# Patient Record
Sex: MALE | Race: WHITE | Employment: FULL TIME | URBAN - METROPOLITAN AREA
[De-identification: names, ages, dates, MRNs, and addresses within clinical notes are randomized per-mention and may not be internally consistent; named-entity substitution may affect disease eponyms.]

---

## 2019-05-18 ENCOUNTER — APPOINTMENT (OUTPATIENT)
Dept: CT IMAGING | Facility: HOSPITAL | Age: 39
DRG: 439 | End: 2019-05-18
Attending: EMERGENCY MEDICINE
Payer: COMMERCIAL

## 2019-05-18 ENCOUNTER — HOSPITAL ENCOUNTER (INPATIENT)
Facility: HOSPITAL | Age: 39
LOS: 4 days | Discharge: HOME | DRG: 439 | End: 2019-05-22
Attending: EMERGENCY MEDICINE | Admitting: INTERNAL MEDICINE
Payer: COMMERCIAL

## 2019-05-18 DIAGNOSIS — R73.9 HYPERGLYCEMIA: ICD-10-CM

## 2019-05-18 DIAGNOSIS — K85.90 ACUTE PANCREATITIS, UNSPECIFIED COMPLICATION STATUS, UNSPECIFIED PANCREATITIS TYPE: Primary | ICD-10-CM

## 2019-05-18 PROBLEM — E87.3 METABOLIC ALKALOSIS: Status: ACTIVE | Noted: 2019-05-18

## 2019-05-18 PROBLEM — E87.1 HYPONATREMIA: Status: ACTIVE | Noted: 2019-05-18

## 2019-05-18 PROBLEM — E87.6 HYPOKALEMIA: Status: ACTIVE | Noted: 2019-05-18

## 2019-05-18 PROCEDURE — 74177 CT ABD & PELVIS W/CONTRAST: CPT | Performed by: EMERGENCY MEDICINE

## 2019-05-18 RX ORDER — ENOXAPARIN SODIUM 100 MG/ML
40 INJECTION SUBCUTANEOUS DAILY
Status: DISCONTINUED | OUTPATIENT
Start: 2019-05-18 | End: 2019-05-22

## 2019-05-18 RX ORDER — DIAZEPAM 5 MG/1
10 TABLET ORAL 2 TIMES DAILY PRN
Status: DISCONTINUED | OUTPATIENT
Start: 2019-05-18 | End: 2019-05-22

## 2019-05-18 RX ORDER — KETOROLAC TROMETHAMINE 30 MG/ML
30 INJECTION, SOLUTION INTRAMUSCULAR; INTRAVENOUS EVERY 6 HOURS PRN
Status: DISCONTINUED | OUTPATIENT
Start: 2019-05-18 | End: 2019-05-22

## 2019-05-18 RX ORDER — KETOROLAC TROMETHAMINE 30 MG/ML
30 INJECTION, SOLUTION INTRAMUSCULAR; INTRAVENOUS ONCE
Status: COMPLETED | OUTPATIENT
Start: 2019-05-18 | End: 2019-05-18

## 2019-05-18 RX ORDER — ONDANSETRON 2 MG/ML
4 INJECTION INTRAMUSCULAR; INTRAVENOUS ONCE
Status: COMPLETED | OUTPATIENT
Start: 2019-05-18 | End: 2019-05-18

## 2019-05-18 RX ORDER — METOCLOPRAMIDE HYDROCHLORIDE 5 MG/ML
10 INJECTION INTRAMUSCULAR; INTRAVENOUS EVERY 8 HOURS PRN
Status: DISCONTINUED | OUTPATIENT
Start: 2019-05-18 | End: 2019-05-22

## 2019-05-18 RX ORDER — HYDROMORPHONE HYDROCHLORIDE 1 MG/ML
0.5 INJECTION, SOLUTION INTRAMUSCULAR; INTRAVENOUS; SUBCUTANEOUS EVERY 30 MIN PRN
Status: ACTIVE | OUTPATIENT
Start: 2019-05-18 | End: 2019-05-18

## 2019-05-18 RX ORDER — ONDANSETRON 2 MG/ML
4 INJECTION INTRAMUSCULAR; INTRAVENOUS EVERY 6 HOURS PRN
Status: DISCONTINUED | OUTPATIENT
Start: 2019-05-18 | End: 2019-05-22

## 2019-05-18 RX ORDER — ACETAMINOPHEN 325 MG/1
650 TABLET ORAL EVERY 6 HOURS PRN
Status: DISCONTINUED | OUTPATIENT
Start: 2019-05-18 | End: 2019-05-22

## 2019-05-18 RX ORDER — MORPHINE SULFATE 4 MG/ML
4 INJECTION, SOLUTION INTRAMUSCULAR; INTRAVENOUS EVERY 2 HOUR PRN
Status: DISCONTINUED | OUTPATIENT
Start: 2019-05-18 | End: 2019-05-19

## 2019-05-18 RX ORDER — MORPHINE SULFATE 4 MG/ML
1 INJECTION, SOLUTION INTRAMUSCULAR; INTRAVENOUS EVERY 2 HOUR PRN
Status: DISCONTINUED | OUTPATIENT
Start: 2019-05-18 | End: 2019-05-19

## 2019-05-18 RX ORDER — SODIUM CHLORIDE 9 MG/ML
INJECTION, SOLUTION INTRAVENOUS CONTINUOUS
Status: DISCONTINUED | OUTPATIENT
Start: 2019-05-18 | End: 2019-05-19

## 2019-05-18 RX ORDER — MORPHINE SULFATE 4 MG/ML
2 INJECTION, SOLUTION INTRAMUSCULAR; INTRAVENOUS EVERY 2 HOUR PRN
Status: DISCONTINUED | OUTPATIENT
Start: 2019-05-18 | End: 2019-05-19

## 2019-05-18 RX ORDER — DEXTROSE MONOHYDRATE 25 G/50ML
50 INJECTION, SOLUTION INTRAVENOUS
Status: DISCONTINUED | OUTPATIENT
Start: 2019-05-18 | End: 2019-05-22

## 2019-05-18 RX ORDER — ONDANSETRON 2 MG/ML
4 INJECTION INTRAMUSCULAR; INTRAVENOUS EVERY 4 HOURS PRN
Status: DISCONTINUED | OUTPATIENT
Start: 2019-05-18 | End: 2019-05-18

## 2019-05-18 RX ORDER — INSULIN ASPART 100 [IU]/ML
0.15 INJECTION, SOLUTION INTRAVENOUS; SUBCUTANEOUS ONCE
Status: COMPLETED | OUTPATIENT
Start: 2019-05-18 | End: 2019-05-18

## 2019-05-18 RX ORDER — SODIUM CHLORIDE 9 MG/ML
INJECTION, SOLUTION INTRAVENOUS CONTINUOUS
Status: ACTIVE | OUTPATIENT
Start: 2019-05-18 | End: 2019-05-18

## 2019-05-18 NOTE — ED INITIAL ASSESSMENT (HPI)
Abdominal pain, began around noon yesterday and progressively getting worse. Laying down makes it worse and hurts when eating.

## 2019-05-18 NOTE — ED PROVIDER NOTES
Patient Seen in: [facility] Emergency Department    History   Patient presents with:  Abdomen/Flank Pain (GI/)    Stated Complaint: abd pain    HPI    Patient is a 43-year-old male who states yesterday around noon he developed epigastric abdominal comfortably on the cart in no acute distress. HEENT: Extraocular muscles intact, pupils equal round reactive to light and accommodation. Mouth normal, neck supple, no meningismus. LUNGS: Lungs clear to auscultation bilaterally.   CARDIOVASCULAR: + S1-S2, CBC W/ DIFFERENTIAL[[id number]]          Abnormal            Final result                 Please view results for these tests on the individual orders.    ETHYL ALCOHOL   RAINBOW DRAW BLUE   RAINBOW DRAW LAVENDER   RAINBOW DRAW LIGHT GREEN

## 2019-05-18 NOTE — PROGRESS NOTES
NURSING ADMISSION NOTE      Patient admitted via Cart  Oriented to room. Safety precautions initiated. Bed in low position. Call light in reach. Patient arrived at this time from ER. Rating pain 7/10. Asking for Toradol.  Explained that Toradol ca

## 2019-05-18 NOTE — H&P
.  CC: Patient presents with:  Abdomen/Flank Pain (GI/)       PCP: Ruthie Holcomb MD    History of Present Illness: Patient is a 46year old male with PMH sig for anxiety who presented with abd pain that started yesterday.  Starts in back and goes Labs   Lab 05/18/19  1402   ALT 38   AST 24   ALB 4.6       No results for input(s): TROP in the last 168 hours.       Radiology: Ct Abdomen+pelvis(contrast Only)(cpt=74177)    Result Date: 5/18/2019  PROCEDURE:  CT ABDOMEN+PELVIS (CONTRAST ONLY) (CPT=74177 pancreatitis  2) etoh use  3) hyperglycemia, concern for underlying diabetes    - npo, IVF, toradol and morphine prn for pain  - advised to abstain from further etoh use. No biliary dilatation or LFT abn, so do not suspect gallstone related pancreatitis.  [place]

## 2019-05-19 PROCEDURE — 99291 CRITICAL CARE FIRST HOUR: CPT | Performed by: NURSE PRACTITIONER

## 2019-05-19 RX ORDER — POTASSIUM CHLORIDE 14.9 MG/ML
20 INJECTION INTRAVENOUS ONCE
Status: COMPLETED | OUTPATIENT
Start: 2019-05-19 | End: 2019-05-19

## 2019-05-19 RX ORDER — HYDROMORPHONE HYDROCHLORIDE 1 MG/ML
0.8 INJECTION, SOLUTION INTRAMUSCULAR; INTRAVENOUS; SUBCUTANEOUS EVERY 2 HOUR PRN
Status: DISCONTINUED | OUTPATIENT
Start: 2019-05-19 | End: 2019-05-19

## 2019-05-19 RX ORDER — HYDROMORPHONE HYDROCHLORIDE 1 MG/ML
0.4 INJECTION, SOLUTION INTRAMUSCULAR; INTRAVENOUS; SUBCUTANEOUS EVERY 2 HOUR PRN
Status: DISCONTINUED | OUTPATIENT
Start: 2019-05-19 | End: 2019-05-19

## 2019-05-19 RX ORDER — DEXTROSE AND SODIUM CHLORIDE 5; .45 G/100ML; G/100ML
150 INJECTION, SOLUTION INTRAVENOUS CONTINUOUS PRN
Status: DISCONTINUED | OUTPATIENT
Start: 2019-05-19 | End: 2019-05-22

## 2019-05-19 RX ORDER — HYDROMORPHONE HYDROCHLORIDE 1 MG/ML
0.2 INJECTION, SOLUTION INTRAMUSCULAR; INTRAVENOUS; SUBCUTANEOUS EVERY 2 HOUR PRN
Status: DISCONTINUED | OUTPATIENT
Start: 2019-05-19 | End: 2019-05-19

## 2019-05-19 RX ORDER — DEXTROSE MONOHYDRATE 25 G/50ML
50 INJECTION, SOLUTION INTRAVENOUS
Status: DISCONTINUED | OUTPATIENT
Start: 2019-05-19 | End: 2019-05-20

## 2019-05-19 NOTE — PLAN OF CARE
Received sleeping in bed. Discussed plan of care when awake. Up to bathroom, Voiding without difficulty. Returned to bed complains of headache. Paged MD to see if Tylenol can be given with sips of water. Gave tylenol with sips of water.  Toradol IV given fo

## 2019-05-19 NOTE — PROGRESS NOTES
9363 - Dr. Mirella Nicholas, endocrinology, paged at this time for new consult. 4041 - call received back from Dr. Mirella Nicholas, order received to add c-peptide to labs from this AM. Order placed. Teddy Sheffield, GI, paged at this time for new consult.    6134 - call

## 2019-05-19 NOTE — CONSULTS
[facility]  Endocrinology Consultation    Anel Guevara Patient Status:  Inpatient    1980 MRN [id number]   [facility] 3NW-A Attending Danie Vega MD   Hosp Day # 1 PCP Romero Mott MD     Reason for Consultation:  Ne Oral, BID PRN  •  ketorolac tromethamine (TORADOL) 30 MG/ML injection 30 mg, 30 mg, Intravenous, Q6H PRN  •  glucose (DEX4) oral liquid 15 g, 15 g, Oral, Q15 Min PRN **OR** Glucose-Vitamin C (DEX-4) 4-6 GM-MG chewable tab 4 tablet, 4 tablet, Oral, Q15 Min Latest Ref Rng & Units 5/19/2019 5/18/2019   Cholesterol, Total      <200 mg/dL  235 (H)   HDL Cholesterol      40 - 59 mg/dL  22 (L)   Triglycerides      30 - 149 mg/dL  3,043 (H)   NON HDL CHOL      <130 mg/dL  213 (H)   Lipase, Serum      73 - 393 U/L 7 Hyperlipidemia  - TG elevated at 3043  - agree with fenofibrate therapy    D/w primary service    Addendum to above:  Given that acute pancreatitis is due to hypertriglyceridemia (TG >3000), recommend insulin gtts to bring TG down.   D/w Dr. Rosalba Calderon who will

## 2019-05-19 NOTE — PROGRESS NOTES
The patient has stable vital signs and he reports that his pain level is down to 5 out of 10 after Toradol administration.

## 2019-05-19 NOTE — PLAN OF CARE
Patient alert and oriented x4. Dilaudid PCA and Toradol for pain. Room air, pulse oximeter on. Denies difficulties breathing or chest pains. Passing gas. Denies nausea. NPO. Voiding. IV fluids infusing per order. Plan of care discussed.  All questions and c

## 2019-05-19 NOTE — PROGRESS NOTES
[facility] Hospitalist note    PCP: Genesis Smith MD    Chief Complaint:  Acute pancreatitis, hyperglycemia    SUBJECTIVE:  Pt does not feel much better, abd pain persistent  Anxious, has questions/concerns which were addressed      OBJECTIVE:  Temp: glucose **OR** Glucose-Vitamin C **OR** dextrose **OR** glucose **OR** Glucose-Vitamin C       Assessment/Plan:     1) acute pancreatitis- possibly related to high triglycerides, some etoh use  - lipase elevated, pt still with sx  - increase IVF  - maintai

## 2019-05-20 RX ORDER — HYDROMORPHONE HYDROCHLORIDE 1 MG/ML
0.3 INJECTION, SOLUTION INTRAMUSCULAR; INTRAVENOUS; SUBCUTANEOUS ONCE
Status: DISCONTINUED | OUTPATIENT
Start: 2019-05-20 | End: 2019-05-22

## 2019-05-20 RX ORDER — POTASSIUM CHLORIDE 14.9 MG/ML
20 INJECTION INTRAVENOUS ONCE
Status: COMPLETED | OUTPATIENT
Start: 2019-05-20 | End: 2019-05-20

## 2019-05-20 NOTE — PLAN OF CARE
Patient received alert and oriented, on room air, insulin drip infusing. Patient remains on insulin drip. Spoke with endocrine regarding discontinuing insulin drip. Instructed to continue at this time due to elevated triglycerides.  Lipid panel ordered ever Progressing     Problem: GASTROINTESTINAL - ADULT  Goal: Minimal or absence of nausea and vomiting  Description  INTERVENTIONS:  - Maintain adequate hydration with IV or PO as ordered and tolerated  - Nasogastric tube to low intermittent suction as ordered

## 2019-05-20 NOTE — PROGRESS NOTES
[facility] Hospitalist Progress Note     Kings Semen Patient Status:  Inpatient    1980 MRN [id number]   [facility] 4SW-A Attending Kerrin Cowden, MD   Hosp Day # 2 PCP Virl Dubin, MD     CC: follow up    SUBJECTIVE:  Bryan Ricardo enoxaparin  40 mg Subcutaneous Daily     Continuous Infusing Medication:  • HYDROmorphone     • Dextrose-NaCl 150 mL/hr (05/20/19 3960)   • insulin regular 0.5 Units/hr (05/20/19 0212)     PRN Medication:Dextrose-NaCl, acetaminophen, ondansetron HCl, Metoc

## 2019-05-20 NOTE — PROGRESS NOTES
[facility]    Progress Note    Margarita Robledo Patient Status:  Inpatient    1980 MRN [id number]   [facility] 4SW-A Attending Caden Nova MD   [facility] Day # 2 PCP Celina Patel MD     Subjective:  Margarita Robledo is a

## 2019-05-20 NOTE — PLAN OF CARE
Assumed care around 1900. Pt alert and oriented x4. SR per tele. BP stable. On insulin drip base algorithym, column 1 per Dr. Bucio Dear order. Blood sugars checked q1 hour. Pt had episode of severe pain in middle of the night with headache and nausea.  Given z adequate hydration with IV or PO as ordered and tolerated  - Nasogastric tube to low intermittent suction as ordered  - Evaluate effectiveness of ordered antiemetic medications  - Provide nonpharmacologic comfort measures as appropriate  - Advance diet as

## 2019-05-20 NOTE — CONSULTS
[facility]    Report of Gastroenterology Consultation    Larry Vivar Patient Status:  Inpatient    1980 MRN [id number]   [facility] 4SW-A Attending Ivan Sampson MD   Hosp Day # 1 PCP Elizabeth Echevarria MD     Date of Adm ondansetron HCl (ZOFRAN) injection 4 mg, 4 mg, Intravenous, Q6H PRN  •  Metoclopramide HCl (REGLAN) injection 10 mg, 10 mg, Intravenous, Q8H PRN  •  diazepam (VALIUM) tab 10 mg, 10 mg, Oral, BID PRN  •  ketorolac tromethamine (TORADOL) 30 MG/ML injection 3 urinary infections, frequent urination, blood in urine, incontinence, kidney failure, prostate problems. Endocrine: Denies thyroid disease, denies diabetes. Female complaints: Denies endometriosis, painful menstrual periods, heavy menstrual periods.   Suleman Baca treatment. AST 20 05/19/2019    ALT 36 05/19/2019     05/19/2019       Imaging:      Assessment/Plan:    Acute pancreatitis likely from hyperTG.   No alcohol hx, gallstones, normal liver tests    Increase IVF to 150 cc/ hr    Endocrine consult for

## 2019-05-20 NOTE — CONSULTS
Pulmonary H&P/Consult       NAME: Natalya Sanchez - ROOM: [address] [place] [address] - MRN: [id number] - Age: 46year old - :  1980    Date of Admission: 2019  1:39 PM  Admission Diagnosis: Hyperglycemia [R73.9]  Acute pancreatitis, unspecified complication sta Alcohol use:  Yes        Alcohol/week: 0.0 oz        Comment: 3x a week       Drug use: No      Sexual activity: Yes        Partners: Female    Lifestyle      Physical activity:        Days per week: Not on file        Minutes per session: Not on file pain/tenderness  RESPIRATORY:  denies SOB, cough, dyspnea   CARDIOVASCULAR:  denies current chest pain   GI:  See above  :  denies dysuria or changes in stream   MUSCULOSKELETAL:  denies back pain   NEURO:  denies headaches, no strokes or seizure history Regular rate and rhythm, S1 and S2 normal, no murmur, rub   or gallop   Abdomen:   Soft, tender to light percussion, hypoactive BS   Extremities:   Extremities normal, atraumatic, no cyanosis or edema   Pulses:   2+ and symmetric all extremities   Skin:

## 2019-05-20 NOTE — PLAN OF CARE
Received patient as direct transfer from floor or initiation of insulin gtt. Alert and oriented. Complaints of abdominal pain relieved by dilaudid PCA. Room air. SR on tele. Educated on insulin gtt. Oriented to room and surroundings and use of call light.

## 2019-05-20 NOTE — PROGRESS NOTES
ICU  Critical Care APRN Progress Note    NAME: Ning Craig - ROOM: [address] [place] [address] - MRN: [id number] - Age: 46year old - :1980    History Of Present Illness:  Ning Craig is a 46year old male with PMHx significant for anxiety, asthma, gout and s Extremities: Extremities normal, atraumatic, no cyanosis or edema,capillary refill <3 sec.     Pulses: 2+ and symmetric all extremities  Skin: Skin color, texture, turgor normal for ethnicity, no rashes or lesions, warm and dry  Neurologic: CNII-XII intact,

## 2019-05-20 NOTE — PROGRESS NOTES
[facility]  Endocrinology Progress Note    Andrew Montero Patient Status:  Inpatient    1980 MRN [id number]   [facility] 4SW-A Attending Arelis Segura MD   Hosp Day # 2 PCP Arsh Rodriguez MD     CC: Patient presents wi Cholesterol      40 - 59 mg/dL   22 (L)   Triglycerides      30 - 149 mg/dL   3,043 (H)   NON HDL CHOL      <130 mg/dL   213 (H)   Lipase, Serum      73 - 393 U/L 787 (H)            Lab Results   Component Value Date    WBC 5.7 05/20/2019    HGB 13.1 05/20 relatives with DM2, C-peptide in process to assess insulin production given pancreatitis  - pt currently on insulin gtts for hypertriglyceridemia as noted above  - discharge regimen will depend on above work-up and hospital course, if pt with insulin produ

## 2019-05-21 PROCEDURE — 99231 SBSQ HOSP IP/OBS SF/LOW 25: CPT | Performed by: CLINICAL NURSE SPECIALIST

## 2019-05-21 RX ORDER — POTASSIUM CHLORIDE 14.9 MG/ML
20 INJECTION INTRAVENOUS ONCE
Status: COMPLETED | OUTPATIENT
Start: 2019-05-21 | End: 2019-05-21

## 2019-05-21 RX ORDER — MORPHINE SULFATE 4 MG/ML
4 INJECTION, SOLUTION INTRAMUSCULAR; INTRAVENOUS
Status: DISCONTINUED | OUTPATIENT
Start: 2019-05-21 | End: 2019-05-22

## 2019-05-21 RX ORDER — MORPHINE SULFATE 4 MG/ML
8 INJECTION, SOLUTION INTRAMUSCULAR; INTRAVENOUS
Status: DISCONTINUED | OUTPATIENT
Start: 2019-05-21 | End: 2019-05-22

## 2019-05-21 RX ORDER — HYDROCODONE BITARTRATE AND ACETAMINOPHEN 10; 325 MG/1; MG/1
1 TABLET ORAL EVERY 4 HOURS PRN
Status: DISCONTINUED | OUTPATIENT
Start: 2019-05-21 | End: 2019-05-22

## 2019-05-21 RX ORDER — MORPHINE SULFATE 4 MG/ML
2 INJECTION, SOLUTION INTRAMUSCULAR; INTRAVENOUS
Status: DISCONTINUED | OUTPATIENT
Start: 2019-05-21 | End: 2019-05-22

## 2019-05-21 RX ORDER — HYDROCODONE BITARTRATE AND ACETAMINOPHEN 5; 325 MG/1; MG/1
1 TABLET ORAL EVERY 4 HOURS PRN
Status: DISCONTINUED | OUTPATIENT
Start: 2019-05-21 | End: 2019-05-22

## 2019-05-21 NOTE — PROGRESS NOTES
[facility] Patient Status:  Inpatient    1980 MRN [id number]   [facility] 4SW-A Attending Rocío Sibley MD   [address] Day # 3 PCP Zac Westfall MD     Critical Care Progress Note     Date of Admission: 5 oz (102 kg)   SpO2 99%   BMI 36.29 kg/m²          Wt Readings from Last 3 Encounters:  05/20/19 : 224 lb 13.9 oz (102 kg)  03/25/16 : 207 lb (93.9 kg)  02/22/16 : 212 lb (96.2 kg)       I/O last 3 completed shifts:   In: 3429.5 [I.V.:3079.5; IV PIGGYBACK:35

## 2019-05-21 NOTE — PROGRESS NOTES
[facility]  Endocrinology Progress Note    Larry Vivar Patient Status:  Inpatient    1980 MRN [id number]   [facility] 4SW-A Attending Jennifer Childs MD   [facility] Day # 3 PCP Elizabeth Echevarria MD     CC: Patient presents wi Cholesterol      40 - 59 mg/dL   22 (L)   Triglycerides      30 - 149 mg/dL   3,043 (H)   NON HDL CHOL      <130 mg/dL   213 (H)   Lipase, Serum      73 - 393 U/L 787 (H)          Lab Results   Component Value Date    WBC 4.5 05/21/2019    HGB 12.9 05/21/2 noted above  - discharge regimen will depend on above work-up and hospital course, if pt with insulin production then will likely send on sulfonylurea therapy, will avoid DPP4-inhibitor and GLP-1 agonist therapy given pancreatitis  - for follow-up as outpt

## 2019-05-21 NOTE — PLAN OF CARE
Assumed pt care this morning. Aa/ox4. Breathing unlabored. Transitioned off pca, pain controlled. Started on clear liquids, tolerating well. Up in chair. Diabetic education initiated.  Patient watched all videos on TV and was given handouts: understanding

## 2019-05-21 NOTE — PLAN OF CARE
Assumed care of patient for night shift. Patient A/O x4. Resting in bed on RA. NSR on monitor, BP stable. Up with stand by assist to the bathroom. Dilaudid PCA for pain control and Toradol for break through pain.  Patient states that his pain is much better

## 2019-05-21 NOTE — PROGRESS NOTES
[facility] Hospitalist Progress Note     Stuart Laboy Patient Status:  Inpatient    1980 MRN [id number]   [facility] 4SW-A Attending Tamia Jiménez MD   Hosp Day # 3 PCP Genesis Smith MD     CC: follow up    SUBJECTIVE:  Silvano Neri o Once   • HYDROmorphone HCl  0.3 mg Intravenous Once   • enoxaparin  40 mg Subcutaneous Daily     Continuous Infusing Medication:  • HYDROmorphone     • Dextrose-NaCl 150 mL/hr (05/21/19 9902)   • insulin regular 0.5 Units/hr (05/21/19 9599)     PRN Medicat

## 2019-05-21 NOTE — DIETARY NOTE
[facility]   CLINICAL NUTRITION    Lora Reinoso     Admitting diagnosis:  Hyperglycemia [R73.9]  Acute pancreatitis, unspecified complication status, unspecified pancreatitis type [K85.90]    Ht: 167.6 cm (5' 6\")  Wt: 102 kg (224 lb 13.9 oz).  This

## 2019-05-21 NOTE — PROGRESS NOTES
[facility]    Progress Note    Stuart Laboy Patient Status:  Inpatient    1980 MRN [id number]   [facility] 4SW-A Attending Tamia Jiménez MD   [address] Day # 3 PCP Genesis Smith MD     Subjective:  Stuart Laboy is a

## 2019-05-22 VITALS
TEMPERATURE: 97 F | HEART RATE: 64 BPM | DIASTOLIC BLOOD PRESSURE: 86 MMHG | BODY MASS INDEX: 35.57 KG/M2 | SYSTOLIC BLOOD PRESSURE: 125 MMHG | WEIGHT: 221.31 LBS | OXYGEN SATURATION: 96 % | HEIGHT: 66 IN | RESPIRATION RATE: 12 BRPM

## 2019-05-22 PROCEDURE — 99233 SBSQ HOSP IP/OBS HIGH 50: CPT | Performed by: CLINICAL NURSE SPECIALIST

## 2019-05-22 RX ORDER — HYDROCODONE BITARTRATE AND ACETAMINOPHEN 5; 325 MG/1; MG/1
1-2 TABLET ORAL EVERY 4 HOURS PRN
Qty: 15 TABLET | Refills: 0 | Status: SHIPPED | OUTPATIENT
Start: 2019-05-22 | End: 2019-05-30 | Stop reason: ALTCHOICE

## 2019-05-22 RX ORDER — BLOOD-GLUCOSE METER
1 EACH MISCELLANEOUS
Qty: 1 KIT | Refills: 0 | Status: SHIPPED | OUTPATIENT
Start: 2019-05-22 | End: 2019-09-06

## 2019-05-22 RX ORDER — FLASH GLUCOSE SENSOR
1 KIT MISCELLANEOUS
Qty: 6 EACH | Refills: 0 | Status: SHIPPED | OUTPATIENT
Start: 2019-05-22 | End: 2019-07-19

## 2019-05-22 RX ORDER — FENOFIBRATE 130 MG/1
130 CAPSULE ORAL
Qty: 90 CAPSULE | Refills: 0 | Status: SHIPPED | OUTPATIENT
Start: 2019-05-22 | End: 2019-07-25

## 2019-05-22 RX ORDER — LANCETS 33 GAUGE
1 EACH MISCELLANEOUS
Qty: 50 EACH | Refills: 1 | Status: SHIPPED | OUTPATIENT
Start: 2019-05-22 | End: 2019-09-06

## 2019-05-22 RX ORDER — FLASH GLUCOSE SCANNING READER
1 EACH MISCELLANEOUS DAILY
Qty: 1 DEVICE | Refills: 0 | Status: SHIPPED | OUTPATIENT
Start: 2019-05-22 | End: 2020-10-02

## 2019-05-22 RX ORDER — GLIMEPIRIDE 4 MG/1
4 TABLET ORAL
Qty: 180 TABLET | Refills: 0 | Status: SHIPPED | OUTPATIENT
Start: 2019-05-22 | End: 2019-06-03 | Stop reason: DRUGHIGH

## 2019-05-22 RX ORDER — BLOOD SUGAR DIAGNOSTIC
STRIP MISCELLANEOUS
Qty: 50 STRIP | Refills: 1 | Status: SHIPPED | OUTPATIENT
Start: 2019-05-22 | End: 2019-09-06

## 2019-05-22 RX ORDER — METFORMIN HYDROCHLORIDE 500 MG/1
TABLET, EXTENDED RELEASE ORAL
Qty: 360 TABLET | Refills: 0 | Status: SHIPPED | OUTPATIENT
Start: 2019-05-22 | End: 2019-07-19

## 2019-05-22 NOTE — PROGRESS NOTES
[facility] Patient Status:  Inpatient    1980 MRN [id number]   [facility] 4SW-A Attending Mahi Domínguez MD   [facility] Day # 4 PCP Jade Bull MD     Pulm / Critical Care Progress Note     S: pt states HGB 13.1 12.9* 13.5   HCT 38.9* 38.5* 41.0   .0 183.0 202.0     No results for input(s): INR in the last 168 hours.       Recent Labs   Lab 05/20/19  0443 05/20/19  1443 05/21/19  0429 05/22/19  0420     --  138 141   K 3.2* 3.5 3.8 3.9   CL

## 2019-05-22 NOTE — PLAN OF CARE
Upon assessment pt resting in bed with insulin drip and fluids infusing. Pt alert and oriented x4, denies pain. Pt up to bathroom and ambulating in room independently after set up. Pt transitioned to subq insulin.  Per GI, Endo, Pulm and primary pt is ok to ordered medications to maintain glucose within target range  - Assess barriers to adequate nutritional intake and initiate nutrition consult as needed  - Instruct patient on self management of diabetes  Outcome: Progressing

## 2019-05-22 NOTE — PROGRESS NOTES
[facility]    Progress Note    Sheldon Mallika Patient Status:  Inpatient    1980 MRN [id number]   [facility] 4SW-A Attending Rocío Sibley MD   [facility] Day # 4 PCP Zac Westfall MD     Subjective:  Sheldoncora Tena is a

## 2019-05-22 NOTE — PROGRESS NOTES
[facility]  Diabetes Follow Up      Rico Hannon  [id number]       Patient seen and evaluated; wife Lizzy Obando,  at bedside.     Recent Labs   Lab 05/22/19  0636 05/22/19  0735 05/22/19  0827 05/22/19  0929 05/22/19  1130   PGLU 141* 136* 161* 12 and well-fitting shoes to prevent injury to feet and he stated he would do this. Recommended an annual flu shot and pneumonia immunization and he agreed to do this. Discussed sick day management and he verbalized understanding.     Taught the One Touch Ve BD pen needles (Rachel); BD syringes  · Glucometer:  One Touch Verio Flex    PROVIDER F/U RECOMMENDATIONS:    · Patient's current PCP  · Endocrinologist - Dr. Maribell De Jesus    A total of 43 minutes were spent with the patient, 100% was spent counseling, providin

## 2019-05-22 NOTE — PLAN OF CARE
Received pt alert, oriented x4, following commands. Able to ambulate in room, stand-by assist. Up to chair at beginning of night. On room air with clear breath sounds. Transitioned to 2L nasal cannula while asleep. Afebrile. Blood pressure stable.  Normal s

## 2019-05-22 NOTE — PLAN OF CARE
Problem: Patient/Family Goals  Goal: Patient/Family Long Term Goal  Description  Patient's Long Term Goal: to get back home and return to my life    Interventions:  - monitor labs  - medicate for pain  - give bowel a rest; NPO  - IV fluids.  - See additi

## 2019-05-22 NOTE — PROGRESS NOTES
[facility]  Endocrinology Progress Note    Cole Cao Patient Status:  Inpatient    1980 MRN [id number]   [facility] 4SW-A Attending Tricia Powers MD   [facility] Day # 4 PCP Faustina Pina MD     CC: Patient presents wi 12:45 PM  4:43 AM   Triglycerides      30 - 149 mg/dL 1,624 (H) 1,511 (H)     Component      Latest Ref Rng & Units 5/19/2019 5/18/2019   Cholesterol, Total      <200 mg/dL   235 (H)   HDL Cholesterol      40 - 59 mg/dL   22 (L)   Triglycerides      30 - 1 complicated by obesity - Body mass index is 35.73 kg/m²., HLD  - will transition to subcutaneous insulin today as follows:  levemir 18 daily  novolog 1 unit for every 10g carbs plus correction 1:10>140 QID  - will avoid DPP4-inhibitor and GLP-1 agonist the

## 2019-05-22 NOTE — CONSULTS
[facility]  Diabetes Consult Note    Dea Fernando Patient Status:  Inpatient    1980 MRN [id number]   [facility] 4SW-A Attending Tricia Kc MD   [address] Day # 3 PCP Isatu Landry MD     Reason for Consult:     New this diagnosis, but is motivated to achieve blood glucose control. He states he is  with two children and is employed in health care analytics which requires travel.   He states he has been told it has not been determined if he has type 1 or type 2 injection     PROVIDER F/U RECOMMENDATIONS:    · Patient's current PCP  · Endocrinologist    A total of 22 minutes were spent with the patient, 100% was spent counseling and coordinating care for new onset diabetes self-management including blood glucose m

## 2019-05-22 NOTE — PAYOR COMM NOTE
STARTED ON GM[facility] 5/18 5/19 TRANSFERRED TO ICU FOR INSULIN DRIP    ADMISSION REVIEW     Payor: [employer]  #:  [id number]  Authorization Number: [id number]      Admit date: 5/18/19  Admit time: 1       Admitting Physician: Lynette Carranza, No  Alcohol 3 drinks at a time 3 nights a week. Did have 3 drinks yesterday. No drugs    Review of Systems    Positive for stated complaint: abd pain  Other systems are as noted in HPI. Constitutional and vital signs reviewed.       All other systems rev within normal limits   LIPASE - Abnormal; Notable for the following components:    Lipase 833 (*)     All other components within normal limits   CBC W/ DIFFERENTIAL - Abnormal; Notable for the following components:    HCT 38.3 (*)     Neutrophil Absolute encounter diagnosis)  Hyperglycemia    Disposition:  Admit  5/18/2019  5:22 pm    Follow-up:  No follow-up provider specified.       Medications Prescribed:  Current Discharge Medication List        Present on Admission  Date Reviewed: 4/30/2015          IC ROS reviewed and negative except for what's stated above.        OBJECTIVE:  BP (!) 139/101   Pulse 86   Temp 97.9 °F (36.6 °C) (Temporal)   Resp 20   Ht 167.6 cm (5' 6\")   Wt 230 lb (104.3 kg)   SpO2 95%   BMI 37.12 kg/m²      Gen- NAD, appears stated age lipase/amylase is recommended to exclude pancreatitis. ADRENALS:  Unremarkable. KIDNEYS:  Unremarkable. AORTA/VASCULAR:  Unremarkable. RETROPERITONEUM:  Unremarkable. BOWEL/MESENTERY:  Unremarkable. Unremarkable appendix.  ABDOMINAL WALL:  Bilateral fat co injection 40 mg     Date Action Dose Route User    5/21/2019 2015 Given 40 mg Subcutaneous (Left Lower Abdomen) Atiya Perez, CHRIS      HYDROcodone-acetaminophen (NORCO) 5-325 MG per tab 1 tablet     Date Action Dose Route User    5/21/2019 2014 Given 1 t with:  Abdomen/Flank Pain (GI/)        HPI: 46year old man admitted 5/19/19 with acute pancreatitis found to have new onset diabetes. Pt with abdominal pain prior to admission. Pt does report history of EtOH use - 3 hard liquor drinks, 3 times a week. if T2DM then will likely send on sulfonylurea therapy, will avoid metformin given EtOH history and will avoid DPP4-inhibitor and GLP-1 agonist therapy given pancreatitis  - for follow-up as outpt, pt given appointment with Dr. Nigel Buckner on 5/30 and also di present  MSK: Full range of motion in extremities, no clubbing, no cyanosis  Skin: no rashes or lesions  Neuro: A&OX3, no focal deficits     Data Review:       Labs:          Recent Labs   Lab 05/18/19  1401   WBC 10.8   HGB 13.6   MCV 87.2   .0     Illness:  Denisha Gandara is a a(n) 46year old male.      Acute onset of abdominal pain starting yesterday. No vomiting.   No relief with pepto     In ER lipase 833.     CT scan trace maame-panc fat stranding.     TG > 3000  Laboratory Data:          Lab R 05/20/19  0700   BP: 119/75 125/89 112/66 114/79   BP Location: Right arm         Pulse: 75 69 72 71   Resp: 16 20 14 13   Temp: 96.9 °F (36.1 °C)         TempSrc: Temporal         SpO2: 94% 96% 97% 97%   Weight:           Height:                    Oxygen throughout                 Recent Labs     05/18/19  1401 05/20/19  0443   WBC 10.8 5.7   HGB 13.6 13.1   MCV 87.2 89.6   .0 175. 0               Recent Labs     05/18/19  1402 05/19/19  0639 05/20/19  0443   *  --  136   K 3.5 3.7 3.2*   C 05/20/2019      05/20/2019     CA 8.1 05/20/2019     ALB 3.2 05/20/2019     ALKPHO 33 05/20/2019     BILT 0.4 05/20/2019     TP 6.9 05/20/2019     AST 22 05/20/2019     ALT 32 05/20/2019     MG 2.0 05/20/2019     PHOS 2.6 05/20/2019         Imaging: follow-up as outpt, pt given appointment with Dr. Haile Murray on 5/30 and also diabetic education appointment with THUY Angeles on 6/6, details listed in discharge instructions        Plan of care discussed with patient/family at bedside.  All questi control- better today  -remains NPO; consider providing clears today  -GI following  3. Asthma  -not on medication  -monitor for exacerbation  4. FEN  -monitor lytes, replace as needed  -diet per endo/GI  5.  Proph  -LMWH  6. DIspo  -ICU until off insulin g control - transition off dilaudid PCA  - norco PRN, IV morphine for breakthrough, toradol PRN  - IVFs  - insulin per endo     2) hypertriglyceridemia  - insulin per endo  - level downtrending, 1100 today  - fenofibrate once off insulin gtt     3) new onset

## 2019-05-23 NOTE — PAYOR COMM NOTE
--------------  DISCHARGE REVIEW    Payor: [employer]  #:  [id number]  Authorization Number: [id number]      Admit date: 5/18/19  Admit time:  3780  Discharge Date: 5/22/2019  2:00 PM     Admitting Physician: Guera Riley MD

## 2019-05-30 PROBLEM — E11.65 UNCONTROLLED TYPE 2 DIABETES MELLITUS WITH HYPERGLYCEMIA (HCC): Status: ACTIVE | Noted: 2019-05-30

## 2019-06-03 NOTE — DISCHARGE SUMMARY
General Medicine Discharge Summary     Patient ID:  Margarita Robledo  46year old  1/19/1980    Admit date: 5/18/2019    Discharge date and time: 5/22/2019  2:00 PM     Attending Physician: Linda Tobias MD    Primary Care Physician: Celina Patel Procedures: Procedure(s) (LRB):  ENDOSCOPIC RETROGRADE CHOLANGIOPANCREATOGRAPHY (ERCP) (N/A)     Code Status: FULL    Disposition: home    Patient Discharge Instructions:   Discharge Medication List as of 5/22/2019  1:06 PM    START taking these medication 30 minutes    Patient had opportunity to ask questions and state understand and agree with therapeutic plan as outlined above.      Thank Alexandria Zamarripa MD

## 2019-06-04 PROBLEM — K76.0 FATTY LIVER: Status: ACTIVE | Noted: 2019-06-04

## 2019-07-18 PROBLEM — E78.5 TYPE 2 DIABETES MELLITUS WITH HYPERLIPIDEMIA (HCC): Status: ACTIVE | Noted: 2019-07-18

## 2019-07-18 PROBLEM — E11.69 TYPE 2 DIABETES MELLITUS WITH HYPERLIPIDEMIA (HCC): Status: ACTIVE | Noted: 2019-07-18

## 2019-07-18 PROBLEM — Z87.19 HISTORY OF PANCREATITIS: Status: ACTIVE | Noted: 2019-07-18

## 2019-07-19 PROBLEM — E11.9 TYPE 2 DIABETES MELLITUS WITHOUT COMPLICATION, WITHOUT LONG-TERM CURRENT USE OF INSULIN (HCC): Status: ACTIVE | Noted: 2019-07-18

## 2020-10-02 PROBLEM — E11.9 TYPE 2 DIABETES MELLITUS WITHOUT COMPLICATION, WITHOUT LONG-TERM CURRENT USE OF INSULIN (HCC): Status: RESOLVED | Noted: 2019-07-18 | Resolved: 2020-10-02

## 2020-10-02 PROBLEM — K85.90 ACUTE PANCREATITIS, UNSPECIFIED COMPLICATION STATUS, UNSPECIFIED PANCREATITIS TYPE (HCC): Status: RESOLVED | Noted: 2019-05-18 | Resolved: 2020-10-02

## 2020-10-02 PROBLEM — K76.0 FATTY LIVER: Status: RESOLVED | Noted: 2019-06-04 | Resolved: 2020-10-02

## 2020-10-02 PROBLEM — K85.90 ACUTE PANCREATITIS (HCC): Status: RESOLVED | Noted: 2019-05-18 | Resolved: 2020-10-02

## 2020-10-02 PROBLEM — K85.90 ACUTE PANCREATITIS, UNSPECIFIED COMPLICATION STATUS, UNSPECIFIED PANCREATITIS TYPE: Status: RESOLVED | Noted: 2019-05-18 | Resolved: 2020-10-02

## 2020-10-02 PROBLEM — E87.1 HYPONATREMIA: Status: RESOLVED | Noted: 2019-05-18 | Resolved: 2020-10-02

## 2020-10-02 PROBLEM — E11.65 UNCONTROLLED TYPE 2 DIABETES MELLITUS WITH HYPERGLYCEMIA (HCC): Status: RESOLVED | Noted: 2019-05-30 | Resolved: 2020-10-02

## 2020-10-02 PROBLEM — E87.3 METABOLIC ALKALOSIS: Status: RESOLVED | Noted: 2019-05-18 | Resolved: 2020-10-02

## 2020-10-02 PROBLEM — R73.9 HYPERGLYCEMIA: Status: RESOLVED | Noted: 2019-05-18 | Resolved: 2020-10-02

## 2020-10-02 PROBLEM — Z87.19 HISTORY OF PANCREATITIS: Status: RESOLVED | Noted: 2019-07-18 | Resolved: 2020-10-02

## 2020-10-02 PROBLEM — K85.90 ACUTE PANCREATITIS: Status: RESOLVED | Noted: 2019-05-18 | Resolved: 2020-10-02

## 2020-10-02 PROBLEM — E87.6 HYPOKALEMIA: Status: RESOLVED | Noted: 2019-05-18 | Resolved: 2020-10-02

## 2021-10-14 ENCOUNTER — APPOINTMENT (OUTPATIENT)
Dept: CT IMAGING | Facility: HOSPITAL | Age: 41
End: 2021-10-14
Attending: EMERGENCY MEDICINE
Payer: COMMERCIAL

## 2021-10-14 ENCOUNTER — HOSPITAL ENCOUNTER (EMERGENCY)
Facility: HOSPITAL | Age: 41
Discharge: HOME | End: 2021-10-14
Payer: COMMERCIAL

## 2021-10-14 ENCOUNTER — APPOINTMENT (OUTPATIENT)
Dept: CT IMAGING | Facility: HOSPITAL | Age: 41
End: 2021-10-14
Payer: COMMERCIAL

## 2021-10-14 VITALS
SYSTOLIC BLOOD PRESSURE: 125 MMHG | WEIGHT: 165 LBS | HEART RATE: 48 BPM | HEIGHT: 66 IN | TEMPERATURE: 98 F | RESPIRATION RATE: 18 BRPM | OXYGEN SATURATION: 100 % | BODY MASS INDEX: 26.52 KG/M2 | DIASTOLIC BLOOD PRESSURE: 74 MMHG

## 2021-10-14 DIAGNOSIS — R10.9 FLANK PAIN: Primary | ICD-10-CM

## 2021-10-14 PROBLEM — R79.89 AZOTEMIA: Status: ACTIVE | Noted: 2021-10-14

## 2021-10-14 LAB
ALBUMIN SERPL-MCNC: 4.6 G/DL (ref 3.4–5)
ALBUMIN/GLOB SERPL: 1.3 {RATIO} (ref 1–2)
ALP LIVER SERPL-CCNC: 52 U/L
ALT SERPL-CCNC: 34 U/L
ANION GAP SERPL CALC-SCNC: 7 MMOL/L (ref 0–18)
AST SERPL-CCNC: 20 U/L (ref 15–37)
BASOPHILS # BLD AUTO: 0.02 X10(3) UL (ref 0–0.2)
BASOPHILS NFR BLD AUTO: 0.3 %
BILIRUB SERPL-MCNC: 0.5 MG/DL (ref 0.1–2)
BILIRUB UR QL STRIP.AUTO: NEGATIVE
BUN BLD-MCNC: 22 MG/DL (ref 7–18)
CALCIUM BLD-MCNC: 9.4 MG/DL (ref 8.5–10.1)
CHLORIDE SERPL-SCNC: 104 MMOL/L (ref 98–112)
CLARITY UR REFRACT.AUTO: CLEAR
COLOR UR AUTO: YELLOW
CREAT BLD-MCNC: 1.01 MG/DL
EOSINOPHIL # BLD AUTO: 0.07 X10(3) UL (ref 0–0.7)
EOSINOPHIL NFR BLD AUTO: 1 %
ERYTHROCYTE [DISTWIDTH] IN BLOOD BY AUTOMATED COUNT: 12.9 %
GLOBULIN PLAS-MCNC: 3.5 G/DL (ref 2.8–4.4)
GLUCOSE BLD-MCNC: 90 MG/DL (ref 70–99)
GLUCOSE UR STRIP.AUTO-MCNC: NEGATIVE MG/DL
HCT VFR BLD AUTO: 40.9 %
HGB BLD-MCNC: 14 G/DL
IMM GRANULOCYTES # BLD AUTO: 0.02 X10(3) UL (ref 0–1)
IMM GRANULOCYTES NFR BLD: 0.3 %
KETONES UR STRIP.AUTO-MCNC: NEGATIVE MG/DL
LEUKOCYTE ESTERASE UR QL STRIP.AUTO: NEGATIVE
LIPASE SERPL-CCNC: 129 U/L (ref 73–393)
LYMPHOCYTES # BLD AUTO: 2.13 X10(3) UL (ref 1–4)
LYMPHOCYTES NFR BLD AUTO: 30.6 %
MCH RBC QN AUTO: 31 PG (ref 26–34)
MCHC RBC AUTO-ENTMCNC: 34.2 G/DL (ref 31–37)
MCV RBC AUTO: 90.7 FL
MONOCYTES # BLD AUTO: 0.31 X10(3) UL (ref 0.1–1)
MONOCYTES NFR BLD AUTO: 4.4 %
NEUTROPHILS # BLD AUTO: 4.42 X10 (3) UL (ref 1.5–7.7)
NEUTROPHILS # BLD AUTO: 4.42 X10(3) UL (ref 1.5–7.7)
NEUTROPHILS NFR BLD AUTO: 63.4 %
NITRITE UR QL STRIP.AUTO: NEGATIVE
OSMOLALITY SERPL CALC.SUM OF ELEC: 289 MOSM/KG (ref 275–295)
PH UR STRIP.AUTO: 8 [PH] (ref 5–8)
PLATELET # BLD AUTO: 212 10(3)UL (ref 150–450)
POTASSIUM SERPL-SCNC: 3.8 MMOL/L (ref 3.5–5.1)
PROT SERPL-MCNC: 8.1 G/DL (ref 6.4–8.2)
PROT UR STRIP.AUTO-MCNC: NEGATIVE MG/DL
RBC # BLD AUTO: 4.51 X10(6)UL
RBC UR QL AUTO: NEGATIVE
SODIUM SERPL-SCNC: 138 MMOL/L (ref 136–145)
SP GR UR STRIP.AUTO: 1.01 (ref 1–1.03)
UROBILINOGEN UR STRIP.AUTO-MCNC: <2 MG/DL
WBC # BLD AUTO: 7 X10(3) UL (ref 4–11)

## 2021-10-14 PROCEDURE — 81003 URINALYSIS AUTO W/O SCOPE: CPT

## 2021-10-14 PROCEDURE — 96361 HYDRATE IV INFUSION ADD-ON: CPT

## 2021-10-14 PROCEDURE — 80053 COMPREHEN METABOLIC PANEL: CPT

## 2021-10-14 PROCEDURE — 96374 THER/PROPH/DIAG INJ IV PUSH: CPT

## 2021-10-14 PROCEDURE — 71275 CT ANGIOGRAPHY CHEST: CPT | Performed by: EMERGENCY MEDICINE

## 2021-10-14 PROCEDURE — 99285 EMERGENCY DEPT VISIT HI MDM: CPT

## 2021-10-14 PROCEDURE — 83690 ASSAY OF LIPASE: CPT

## 2021-10-14 PROCEDURE — 96376 TX/PRO/DX INJ SAME DRUG ADON: CPT

## 2021-10-14 PROCEDURE — 74174 CTA ABD&PLVS W/CONTRAST: CPT | Performed by: EMERGENCY MEDICINE

## 2021-10-14 PROCEDURE — 85025 COMPLETE CBC W/AUTO DIFF WBC: CPT

## 2021-10-14 PROCEDURE — 96375 TX/PRO/DX INJ NEW DRUG ADDON: CPT

## 2021-10-14 PROCEDURE — 74176 CT ABD & PELVIS W/O CONTRAST: CPT

## 2021-10-14 RX ORDER — KETOROLAC TROMETHAMINE 30 MG/ML
15 INJECTION, SOLUTION INTRAMUSCULAR; INTRAVENOUS ONCE
Status: COMPLETED | OUTPATIENT
Start: 2021-10-14 | End: 2021-10-14

## 2021-10-14 RX ORDER — HYDROMORPHONE HYDROCHLORIDE 1 MG/ML
0.5 INJECTION, SOLUTION INTRAMUSCULAR; INTRAVENOUS; SUBCUTANEOUS ONCE
Status: COMPLETED | OUTPATIENT
Start: 2021-10-14 | End: 2021-10-14

## 2021-10-14 RX ORDER — CYCLOBENZAPRINE HCL 10 MG
10 TABLET ORAL NIGHTLY
Qty: 20 TABLET | Refills: 0 | Status: SHIPPED | OUTPATIENT
Start: 2021-10-14 | End: 2021-10-20 | Stop reason: ALTCHOICE

## 2021-10-14 RX ORDER — SODIUM CHLORIDE 9 MG/ML
1000 INJECTION, SOLUTION INTRAVENOUS ONCE
Status: COMPLETED | OUTPATIENT
Start: 2021-10-14 | End: 2021-10-14

## 2021-10-14 NOTE — ED QUICK NOTES
Reviewed discharge paperwork with patient, verbalized understanding. Pt is leaving to home with self, ambulatory with steady gait, in no distress, and is stable at this time.

## 2021-10-14 NOTE — ED PROVIDER NOTES
Patient Seen in: [facility] Emergency Department      History   Patient presents with:  Abdomen/Flank Pain    Stated Complaint: kidney stone    Subjective:   HPI    Patient is a 78-year-old male coming with left flank pain.   Is been going about 2 da 18   Ht 167.6 cm (5' 6\")   Wt 74.8 kg   SpO2 100%   BMI 26.63 kg/m²         Physical Exam  Vitals and nursing note reviewed. Constitutional:       General: He is not in acute distress. Appearance: He is well-developed. He is not toxic-appearing.    H (CPT=71275/64425)         COMPARISON:  [facility] , CT, CT ABDOMEN+PELVIS KIDNEYSTONE 2D RNDR(NO IV,NO     ORAL)(CPT=74176), 10/14/2021, 11:40 AM.         INDICATIONS:  Patient presents with left flank pain, left back pain and     left lower quadrant abdominal The stomach and duodenum sweep are unremarkable. There     are noted to be loops of borderline dilated small bowel within the central     aspect of the abdomen with air/fluid levels and small bowel feces sign,     measuring up to 3 cm in diameter.       Sterling Matute were used. Dose information is transmitted to the [facility])     NRDR ([address]) which includes the Dose Index     Registry.          PATIENT STATED HISTORY: (As transcribed by Technologist)  Left lower stone search     protocol.   If further investigation of abdominal pain is needed, consider     follow-up with a dedicated contrast enhanced exam of the abdomen and     pelvis with intravenous and oral contrast.              Dictated by (CST): Jazmin Wiley Technologist)  Patient complains     of left lower flank pain that goes around to his back. He states the pain     is sharp. CONTRAST USED:  100cc of Omnipaque 350         FINDINGS:           CHEST:      LUNGS:  No visible pulmonary disease. lesion or fracture.                                 =====    CONCLUSION:      1. No evidence of an acute process within the chest.  No evidence of     pulmonary embolism. Normal appearance to the thoracic aorta and abdominal     aorta.   Incidental note is structures on NONCONTRAST CT exam tailored for urinary tract     imaging, with inherent limitations     thereof. ADRENALS:  Unremarkable. LIVER:  Unremarkable. BILIARY:  Unremarkable. PANCREAS:  Unremarkable. mg Intravenous Given 10/14/21 1322)   HYDROmorphone HCl (DILAUDID) 1 MG/ML injection 0.5 mg (0.5 mg Intravenous Given 10/14/21 1322)   iohexol (OMNIPAQUE) 350 MG/ML injection 100 mL (100 mL Intravenous Given 10/14/21 1414)     IV established.   Patient was days  Return to the ER if you feel worse in any way, Return to the ER if you have any concerns          Medications Prescribed:  Current Discharge Medication List    START taking these medications    cyclobenzaprine 10 MG Oral Tab  Take 1 tablet (10 mg tot

## 2023-06-06 ENCOUNTER — OFFICE VISIT (OUTPATIENT)
Dept: FAMILY MEDICINE CLINIC | Facility: CLINIC | Age: 43
End: 2023-06-06
Payer: COMMERCIAL

## 2023-06-06 VITALS
TEMPERATURE: 97 F | DIASTOLIC BLOOD PRESSURE: 53 MMHG | RESPIRATION RATE: 16 BRPM | BODY MASS INDEX: 26.52 KG/M2 | WEIGHT: 165 LBS | HEART RATE: 66 BPM | SYSTOLIC BLOOD PRESSURE: 109 MMHG | HEIGHT: 66 IN | OXYGEN SATURATION: 99 %

## 2023-06-06 DIAGNOSIS — J02.9 SORE THROAT: ICD-10-CM

## 2023-06-06 DIAGNOSIS — J06.9 VIRAL UPPER RESPIRATORY ILLNESS: Primary | ICD-10-CM

## 2023-06-06 LAB
CONTROL LINE PRESENT WITH A CLEAR BACKGROUND (YES/NO): YES YES/NO
KIT LOT #: NORMAL NUMERIC

## 2023-06-06 PROCEDURE — 3008F BODY MASS INDEX DOCD: CPT | Performed by: NURSE PRACTITIONER

## 2023-06-06 PROCEDURE — 87880 STREP A ASSAY W/OPTIC: CPT | Performed by: NURSE PRACTITIONER

## 2023-06-06 PROCEDURE — 99202 OFFICE O/P NEW SF 15 MIN: CPT | Performed by: NURSE PRACTITIONER

## 2023-06-06 PROCEDURE — 3074F SYST BP LT 130 MM HG: CPT | Performed by: NURSE PRACTITIONER

## 2023-06-06 PROCEDURE — 3078F DIAST BP <80 MM HG: CPT | Performed by: NURSE PRACTITIONER

## 2024-06-05 ENCOUNTER — HOSPITAL ENCOUNTER (EMERGENCY)
Facility: HOSPITAL | Age: 44
Discharge: HOME | End: 2024-06-05
Attending: EMERGENCY MEDICINE
Payer: COMMERCIAL

## 2024-06-05 VITALS
RESPIRATION RATE: 18 BRPM | SYSTOLIC BLOOD PRESSURE: 127 MMHG | TEMPERATURE: 98 F | DIASTOLIC BLOOD PRESSURE: 92 MMHG | OXYGEN SATURATION: 100 % | HEART RATE: 82 BPM

## 2024-06-05 DIAGNOSIS — M10.071 ACUTE IDIOPATHIC GOUT OF RIGHT FOOT: Primary | ICD-10-CM

## 2024-06-05 PROCEDURE — 99283 EMERGENCY DEPT VISIT LOW MDM: CPT

## 2024-06-05 RX ORDER — PREDNISONE 20 MG/1
60 TABLET ORAL ONCE
Status: COMPLETED | OUTPATIENT
Start: 2024-06-05 | End: 2024-06-05

## 2024-06-05 RX ORDER — HYDROCODONE BITARTRATE AND ACETAMINOPHEN 5; 325 MG/1; MG/1
2 TABLET ORAL ONCE
Status: COMPLETED | OUTPATIENT
Start: 2024-06-05 | End: 2024-06-05

## 2024-06-05 RX ORDER — PREDNISONE 20 MG/1
40 TABLET ORAL DAILY
Qty: 10 TABLET | Refills: 0 | Status: SHIPPED | OUTPATIENT
Start: 2024-06-05 | End: 2024-06-10

## 2024-06-05 RX ORDER — HYDROCODONE BITARTRATE AND ACETAMINOPHEN 5; 325 MG/1; MG/1
1-2 TABLET ORAL EVERY 6 HOURS PRN
Qty: 10 TABLET | Refills: 0 | Status: SHIPPED | OUTPATIENT
Start: 2024-06-05 | End: 2024-06-10

## 2024-06-05 NOTE — ED INITIAL ASSESSMENT (HPI)
Pt arrives to ED with c/o right foot pain. Pt states that he has a stress fracture to the top of the right foot that happened in April that he is in an air cast for. On Sunday, pt started to develop great right toe pain. Hx of gout. No fevers.

## 2024-06-05 NOTE — ED PROVIDER NOTES
Patient Seen in: [facility] Emergency Department      History     Chief Complaint   Patient presents with    Leg or Foot Injury     Stated Complaint: right foot pain    Subjective:   HPI    44-year-old male who presents to the emergency department complaining of having an exacerbation of gout.  The patient's had pain in his great toe of his right foot.  He has been on colchicine and indomethacin without relief.  Says that a couple of days ago he was riding his bike when the symptoms began and they progressively worsened.  The last time he had a significant gouty attack was 4 years ago.  At that time he was taking preventative medications for his gout but has not been on the medications for the past 4 years.  He denies any systemic fevers or chills.  No change in his diet.  Reviewing his records he was recently seen for an office visit on 1/25/2024 for strain of the adductor muscles of the thigh.    Objective:   Past Medical History:    Acute pancreatitis (HCC)    Acute pancreatitis, unspecified complication status, unspecified pancreatitis type (HCC)    Anxiety    Asthma (HCC)    Gout    Gout    History of pancreatitis    Type 2 diabetes mellitus without complication, without long-term current use of insulin (HCC)    Uncontrolled type 2 diabetes mellitus with hyperglycemia (HCC)              Past Surgical History:   Procedure Laterality Date    Other surgical history      left ankle surgery - rolled playing division I tennis                Social History     Socioeconomic History    Marital status:    Tobacco Use    Smoking status: Former    Smokeless tobacco: Never   Substance and Sexual Activity    Alcohol use: Yes     Alcohol/week: 0.0 standard drinks of alcohol     Comment: 3x a week     Drug use: No    Sexual activity: Yes     Partners: Female     Social Determinants of Health      Received from [facility]    Housing Stability              Review of Systems    Positive for  stated complaint: right foot pain  Other systems are as noted in HPI.  Constitutional and vital signs reviewed.      All other systems reviewed and negative except as noted above.    Physical Exam     ED Triage Vitals   BP 06/05/24 0646 (!) 127/92   Pulse 06/05/24 0646 82   Resp 06/05/24 0646 22   Temp 06/05/24 0646 98.7 °F (37.1 °C)   Temp src 06/05/24 0647 Oral   SpO2 06/05/24 0646 100 %   O2 Device 06/05/24 0646 None (Room air)       Current Vitals:   Vital Signs  BP: (!) 127/92  Pulse: 82  Resp: 18  Temp: 98.3 °F (36.8 °C)  Temp src: Oral    Oxygen Therapy  SpO2: 100 %  O2 Device: None (Room air)            Physical Exam  General: 44-year-old male complaining of significant pain in his right great toe and foot.  He shedding some tears at this time but he is pleasant and GCS 15.  Focused exam of the patient's right lower extremity: There is some erythema of the great toe with slight swelling.  Slight warmth to palpation.  Some tenderness to palpation.  No midfoot tenderness.  Pulses are +2.  No heel tenderness.  No ankle discomfort or thigh discomfort.  No calf swelling or tenderness.    ED Course   Labs Reviewed - No data to display                   MDM    Differential diagnosis includes but is not limited to acute gouty exacerbation, cellulitis, fracture, osteomyelitis.  The patient has had no systemic signs of illness.  This is similar to his gouty exacerbations that he has had in the past.  He does have a known history of gout.  He has not been on any preventative medications.  He is presently taking indomethacin and colchicine but has had minimal relief of his symptomology.  He did take some Tylenol prior to arriving.  He will be given prednisone as well as Norco for pain control.  Encouraged to follow with his podiatrist.  He does have an appointment later on today.  Also encouraged to consider restarting preventative medications.  I did explain to the patient the goal of palliation of discomfort.  Will  not likely make his pain completely disappear as explained the realistic expectations for the ER visit for the gouty exacerbation.  He will be discharged.  Note to Patient  The 21st Century Cures Act makes medical notes like these available to patients in the interest of transparency. However, be advised this is a medical document and is intended as [id number] communication; it is written in medical language and may appear blunt, direct, or contain abbreviations or verbiage that are unfamiliar. Medical documents are intended to carry relevant information, facts as evident, and the clinical opinion of the practitioner.  Patient was evaluated and a screening exam was performed.   As a treating physician attending to the patient, I determined, within reasonable clinical confidence and prior to discharge, that an emergency medical condition was not or was no longer present.  There was no indication for further evaluation, treatment or admission on an emergency basis.  Comprehensive verbal and written discharge and follow-up instructions were provided to help prevent relapse or worsening.  Patient was instructed to follow-up with their primary care provider for further evaluation and treatment, but to return immediately to the ER for worsening, concerning, new, changing or persisting symptoms.  I discussed the case with the patient and they had no questions, complaints, or concerns.  Patient felt comfortable going home.  ^^Please note that this report has been produced using speech recognition software and may contain errors related to that system including, but not limited to, errors in grammar, punctuation, and spelling, as well as words and phrases that possibly may have been recognized inappropriately.  If there are any questions or concerns, contact the dictating provider for clarification.                           Medical Decision Making      Disposition and Plan     Clinical Impression:  1. Acute idiopathic  gout of right foot         Disposition:  Discharge  6/5/2024  7:04 am    Follow-up:  Your podiatrist    Follow up today            Medications Prescribed:  Current Discharge Medication List        START taking these medications    Details   predniSONE 20 MG Oral Tab Take 2 tablets (40 mg total) by mouth daily for 5 days.  Qty: 10 tablet, Refills: 0      HYDROcodone-acetaminophen 5-325 MG Oral Tab Take 1-2 tablets by mouth every 6 (six) hours as needed for Pain.  Qty: 10 tablet, Refills: 0    Associated Diagnoses: Acute idiopathic gout of right foot

## (undated) NOTE — IP AVS SNAPSHOT
Patient Demographics     Address  [address] [place] [address] Phone  [phone] [facility])  [phone] [facility] E-mail Address  [email or web address]      Emergency Contact(s)     Name Relation Home Work Theletra Spouse [phone] Application by Does not apply route every 14 (fourteen) days. Arvind Monk MD   [    ]   [    ]   [    ]   [    ]     glimepiride 4 MG Tabs  Commonly known as:  AMARYL      Take 1 tablet (4 mg total) by mouth 2 (two) times daily before meals.    Jerica Nuñez [id number] acetaminophen (TYLENOL) tab 650 mg 05/22/19 0412 Given      [id number] dextrose 5 %-0.45 % NaCl infusion 05/21/19 1520 New Bag      [id number] dextrose 5 %-0.45 % NaCl infusion 05/21/19 2206 New Bag      [id number] dextrose 5 %-0.45 % NaCl infusion Components    Component Value Reference Range Flag Lab   Cholesterol, Total 293 <200 mg/dL [facility]   Comment:           Desirable  <200 mg/dL  Borderline  200-239 mg/dL  High      >=240 mg/dL       HDL Cholesterol 24 40 - 59 mg/dL  [facility] Calculated Osmolality 291 275 - 295 mOsm/kg — [facility]   GFR, Non- 112 >=60 — [facility]   GFR, -American 130 >=60 [facility]            MAGNESIUM [[id number]] (Normal)  Resulted: 05/22/19 0522, Result status: Final result   Order DIRECT LDL [[id number]] (Abnormal)  Resulted: 05/21/19 2044, Result status: Final result   Ordering provider:  Lazarus Ren MD  05/21/19 1830 Resulting lab:  [facility]    Specimen Information    Type Source Collected On   Blood — 05/21/19 7807          Comp Borderline  130-159 mg/dL   High        160-189 mg/dL       Very high >=190 mg/dL                Testing Performed By     Lab - Abbreviation Name Director Address Valid Date Range    23 - [facility] [address] [facility],[address], [place] [address] [facility] Smoking status: Former Smoker      Smokeless tobacco: Never Used    Alcohol use: Yes      Alcohol/week: 0.0 oz      Comment: 3x a week        Fam Hx  History reviewed. No pertinent family history.     Review of Systems  Comprehensive ROS reviewed and ne and/or scarring. LIVER:  Mild fatty infiltration of the liver with focal fatty sparing in the gallbladder fossa. BILIARY:  Unremarkable. SPLEEN:  Unremarkable. PANCREAS:  There is a trace amount of peripancreatic fat stranding.   Clinical correlation with s Filed:  5/20/2019  8:33 AM Date of Service:  5/20/2019  7:35 AM Status:  Signed    :  Heidi Alonso MD (Physician)     Consult Orders    1.  IP consult to [facility] [[id number]] ordered by Kathie Altman MD at 05/19/19 1748 Worry: Not on file        Inability: Not on file      Transportation needs:        Medical: Not on file        Non-medical: Not on file    Tobacco Use      Smoking status: Former Smoker      Smokeless tobacco: Never Used    Substance and Sexual Activ injection, glucose **OR** Glucose-Vitamin C **OR** dextrose **OR** glucose **OR** Glucose-Vitamin C     REVIEW OF SYSTEMS:   GENERAL:  feels well otherwise   SKIN:  denies any unusual skin lesions   EYES:  denies blurred vision or double vision   HEENT:  d Neck:   Supple, symmetrical, trachea midline, no adenopathy;        thyroid:  No enlargement/tenderness/nodules; no carotid    bruit or JVD   Back:     Symmetric, no curvature, ROM normal, no CVA tenderness   Lungs:     Clear to auscultation bilaterally, r -will follow while in ICU                   [facility][DM.1]    Electronically signed by Nino Perdomo MD on 5/20/2019  8:33 AM   Attribution Key    DM. 1 - Nino Perdomo MD on 5/20/2019  7:35 AM  DM. 2 -

## (undated) NOTE — IP AVS SNAPSHOT
[address]            (For Outpatient Use Only) Initial Admit Date: 5/18/2019   Inpt/Obs Admit Date: Inpt: 5/18/19 / Obs: N/A   Discharge Date:    Kati Wick:  [de-identified]   MRN: [de-identified]   CSN: [id number]   [place] [address]: [id number] Hospital Account Financial Class: Managed Care    May 22, 2019

## (undated) NOTE — LETTER
Yudy Godinez [address] [address]  [place] [facility]    Consent for Operation  Date: __________________                                Time: _______________    1.  I authorize the performance upon [employer] the following operation:  Procedure procedure has been videotaped, the surgeon will obtain the original videotape. The hospital will not be responsible for storage or maintenance of this tape.   7. For the purpose of advancing medical education, I consent to the admittance of observers to the STATEMENTS REQUIRING INSERTION OR COMPLETION WERE FILLED IN.     Signature of Patient:   ___________________________    When the patient is a minor or mentally incompetent to give consent:  Signature of person authorized to consent for patient: ____________ supplements, and pills I can buy without a prescription (including street drugs/illegal medications). Failure to inform my anesthesiologist about these medicines may increase my risk of anesthetic complications. iv.  If I am allergic to anything or have ha Anesthesiologist Signature     Date   Time  I have discussed the procedure and information above with the patient (or patient’s representative) and answered their questions. The patient or their representative has agreed to have anesthesia services.     ___

## (undated) NOTE — LETTER
Yudy Godinez [address] [address]  [place], [address] [facility]    Consent for Operation  Date: __________________                                Time: _______________    1.  I authorize the performance upon [employer] the following operation:  Procedure procedure has been videotaped, the surgeon will obtain the original videotape. The hospital will not be responsible for storage or maintenance of this tape.   7. For the purpose of advancing medical education, I consent to the admittance of observers to the STATEMENTS REQUIRING INSERTION OR COMPLETION WERE FILLED IN.     Signature of Patient:   ___________________________    When the patient is a minor or mentally incompetent to give consent:  Signature of person authorized to consent for patient: ____________ supplements, and pills I can buy without a prescription (including street drugs/illegal medications). Failure to inform my anesthesiologist about these medicines may increase my risk of anesthetic complications. iv.  If I am allergic to anything or have ha Anesthesiologist Signature     Date   Time  I have discussed the procedure and information above with the patient (or patient’s representative) and answered their questions. The patient or their representative has agreed to have anesthesia services.     ___